# Patient Record
Sex: FEMALE | Race: BLACK OR AFRICAN AMERICAN | NOT HISPANIC OR LATINO | Employment: UNEMPLOYED | URBAN - METROPOLITAN AREA
[De-identification: names, ages, dates, MRNs, and addresses within clinical notes are randomized per-mention and may not be internally consistent; named-entity substitution may affect disease eponyms.]

---

## 2021-05-31 ENCOUNTER — RECORDS - HEALTHEAST (OUTPATIENT)
Dept: ADMINISTRATIVE | Facility: CLINIC | Age: 56
End: 2021-05-31

## 2021-06-16 PROBLEM — N89.8 VAGINAL DISCHARGE: Status: ACTIVE | Noted: 2019-05-29

## 2021-06-16 PROBLEM — M79.671 RIGHT FOOT PAIN: Status: ACTIVE | Noted: 2019-05-02

## 2021-06-16 PROBLEM — M54.42 CHRONIC BILATERAL LOW BACK PAIN WITH BILATERAL SCIATICA: Status: ACTIVE | Noted: 2018-04-26

## 2021-06-16 PROBLEM — G89.29 CHRONIC BILATERAL LOW BACK PAIN WITH BILATERAL SCIATICA: Status: ACTIVE | Noted: 2018-04-26

## 2021-06-16 PROBLEM — H52.13 MYOPIA OF BOTH EYES WITH ASTIGMATISM AND PRESBYOPIA: Status: ACTIVE | Noted: 2017-07-06

## 2021-06-16 PROBLEM — D50.0 IRON DEFICIENCY ANEMIA SECONDARY TO BLOOD LOSS (CHRONIC): Status: ACTIVE | Noted: 2020-01-24

## 2021-06-16 PROBLEM — R63.0 LOSS OF APPETITE: Status: ACTIVE | Noted: 2017-06-23

## 2021-06-16 PROBLEM — N20.0 RIGHT NEPHROLITHIASIS: Status: ACTIVE | Noted: 2019-05-02

## 2021-06-16 PROBLEM — R52 BODY ACHES: Status: ACTIVE | Noted: 2018-03-21

## 2021-06-16 PROBLEM — K42.9 UMBILICAL HERNIA WITHOUT OBSTRUCTION AND WITHOUT GANGRENE: Status: ACTIVE | Noted: 2019-05-02

## 2021-06-16 PROBLEM — R10.84 GENERALIZED ABDOMINAL PAIN: Status: ACTIVE | Noted: 2020-01-24

## 2021-06-16 PROBLEM — M54.41 CHRONIC BILATERAL LOW BACK PAIN WITH BILATERAL SCIATICA: Status: ACTIVE | Noted: 2018-04-26

## 2021-06-16 PROBLEM — H04.123 DRY EYES, BILATERAL: Status: ACTIVE | Noted: 2019-02-28

## 2021-06-16 PROBLEM — Z12.11 ENCOUNTER FOR SCREENING COLONOSCOPY: Status: ACTIVE | Noted: 2018-03-21

## 2021-06-16 PROBLEM — R63.4 UNINTENTIONAL WEIGHT LOSS: Status: ACTIVE | Noted: 2017-06-14

## 2021-06-16 PROBLEM — R07.9 CHEST PAIN: Status: ACTIVE | Noted: 2020-01-24

## 2021-06-16 PROBLEM — N89.8 VAGINAL DRYNESS: Status: ACTIVE | Noted: 2018-03-21

## 2021-06-16 PROBLEM — H40.039 ANATOMICAL NARROW ANGLE: Status: ACTIVE | Noted: 2017-07-06

## 2021-06-16 PROBLEM — H52.4 MYOPIA OF BOTH EYES WITH ASTIGMATISM AND PRESBYOPIA: Status: ACTIVE | Noted: 2017-07-06

## 2021-06-16 PROBLEM — H52.203 MYOPIA OF BOTH EYES WITH ASTIGMATISM AND PRESBYOPIA: Status: ACTIVE | Noted: 2017-07-06

## 2021-06-16 PROBLEM — E55.9 VITAMIN D DEFICIENCY: Status: ACTIVE | Noted: 2018-03-21

## 2021-06-16 PROBLEM — J01.20 SUBACUTE ETHMOIDAL SINUSITIS: Status: ACTIVE | Noted: 2020-02-26

## 2022-02-08 ENCOUNTER — OFFICE VISIT (OUTPATIENT)
Dept: FAMILY MEDICINE | Facility: CLINIC | Age: 57
End: 2022-02-08
Payer: COMMERCIAL

## 2022-02-08 ENCOUNTER — HOSPITAL ENCOUNTER (EMERGENCY)
Facility: HOSPITAL | Age: 57
Discharge: HOME | End: 2022-02-08
Attending: EMERGENCY MEDICINE | Admitting: EMERGENCY MEDICINE
Payer: COMMERCIAL

## 2022-02-08 ENCOUNTER — APPOINTMENT (OUTPATIENT)
Dept: RADIOLOGY | Facility: HOSPITAL | Age: 57
End: 2022-02-08
Payer: COMMERCIAL

## 2022-02-08 VITALS
SYSTOLIC BLOOD PRESSURE: 125 MMHG | DIASTOLIC BLOOD PRESSURE: 83 MMHG | TEMPERATURE: 99 F | OXYGEN SATURATION: 97 % | HEART RATE: 82 BPM | RESPIRATION RATE: 16 BRPM

## 2022-02-08 VITALS
DIASTOLIC BLOOD PRESSURE: 78 MMHG | BODY MASS INDEX: 27.88 KG/M2 | TEMPERATURE: 98.3 F | HEART RATE: 84 BPM | SYSTOLIC BLOOD PRESSURE: 152 MMHG | WEIGHT: 142 LBS | OXYGEN SATURATION: 100 % | RESPIRATION RATE: 18 BRPM | HEIGHT: 60 IN

## 2022-02-08 DIAGNOSIS — R07.89 CHEST DISCOMFORT: ICD-10-CM

## 2022-02-08 DIAGNOSIS — R07.9 CHEST PAIN, UNSPECIFIED TYPE: ICD-10-CM

## 2022-02-08 DIAGNOSIS — R68.83 CHILLS: ICD-10-CM

## 2022-02-08 DIAGNOSIS — N89.8 VAGINAL DISCHARGE: ICD-10-CM

## 2022-02-08 DIAGNOSIS — R05.9 COUGH: Primary | ICD-10-CM

## 2022-02-08 DIAGNOSIS — J06.9 VIRAL UPPER RESPIRATORY TRACT INFECTION: ICD-10-CM

## 2022-02-08 LAB
ALBUMIN SERPL-MCNC: 4.1 G/DL (ref 3.5–5)
ALBUMIN UR-MCNC: NEGATIVE MG/DL
ALP SERPL-CCNC: 86 U/L (ref 45–120)
ALT SERPL W P-5'-P-CCNC: 20 U/L (ref 0–45)
ANION GAP SERPL CALCULATED.3IONS-SCNC: 11 MMOL/L (ref 5–18)
APPEARANCE UR: CLEAR
AST SERPL W P-5'-P-CCNC: 16 U/L (ref 0–40)
BASOPHILS # BLD AUTO: 0 10E3/UL (ref 0–0.2)
BASOPHILS NFR BLD AUTO: 1 %
BILIRUB DIRECT SERPL-MCNC: <0.1 MG/DL
BILIRUB SERPL-MCNC: 0.3 MG/DL (ref 0–1)
BILIRUB UR QL STRIP: NEGATIVE
BUN SERPL-MCNC: 12 MG/DL (ref 8–22)
CALCIUM SERPL-MCNC: 8.8 MG/DL (ref 8.5–10.5)
CHLORIDE BLD-SCNC: 107 MMOL/L (ref 98–107)
CLUE CELLS: ABNORMAL
CO2 SERPL-SCNC: 23 MMOL/L (ref 22–31)
COLOR UR AUTO: YELLOW
CREAT SERPL-MCNC: 0.8 MG/DL (ref 0.6–1.1)
EOSINOPHIL # BLD AUTO: 0.1 10E3/UL (ref 0–0.7)
EOSINOPHIL NFR BLD AUTO: 2 %
ERYTHROCYTE [DISTWIDTH] IN BLOOD BY AUTOMATED COUNT: 15.1 % (ref 10–15)
FLUAV RNA SPEC QL NAA+PROBE: NEGATIVE
FLUBV RNA RESP QL NAA+PROBE: NEGATIVE
GFR SERPL CREATININE-BSD FRML MDRD: 87 ML/MIN/1.73M2
GLUCOSE BLD-MCNC: 133 MG/DL (ref 70–125)
GLUCOSE UR STRIP-MCNC: NEGATIVE MG/DL
HCT VFR BLD AUTO: 38.7 % (ref 35–47)
HGB BLD-MCNC: 12.2 G/DL (ref 11.7–15.7)
HGB UR QL STRIP: NEGATIVE
IMM GRANULOCYTES # BLD: 0 10E3/UL
IMM GRANULOCYTES NFR BLD: 0 %
KETONES UR STRIP-MCNC: ABNORMAL MG/DL
LEUKOCYTE ESTERASE UR QL STRIP: NEGATIVE
LIPASE SERPL-CCNC: 25 U/L (ref 0–52)
LYMPHOCYTES # BLD AUTO: 1.8 10E3/UL (ref 0.8–5.3)
LYMPHOCYTES NFR BLD AUTO: 26 %
MCH RBC QN AUTO: 27.7 PG (ref 26.5–33)
MCHC RBC AUTO-ENTMCNC: 31.5 G/DL (ref 31.5–36.5)
MCV RBC AUTO: 88 FL (ref 78–100)
MONOCYTES # BLD AUTO: 0.4 10E3/UL (ref 0–1.3)
MONOCYTES NFR BLD AUTO: 6 %
NEUTROPHILS # BLD AUTO: 4.5 10E3/UL (ref 1.6–8.3)
NEUTROPHILS NFR BLD AUTO: 65 %
NITRATE UR QL: NEGATIVE
NRBC # BLD AUTO: 0 10E3/UL
NRBC BLD AUTO-RTO: 0 /100
PH UR STRIP: 6 [PH] (ref 5–8)
PLATELET # BLD AUTO: 391 10E3/UL (ref 150–450)
POTASSIUM BLD-SCNC: 3.8 MMOL/L (ref 3.5–5)
PROT SERPL-MCNC: 7.5 G/DL (ref 6–8)
RBC # BLD AUTO: 4.4 10E6/UL (ref 3.8–5.2)
SARS-COV-2 RNA RESP QL NAA+PROBE: NEGATIVE
SODIUM SERPL-SCNC: 141 MMOL/L (ref 136–145)
SP GR UR STRIP: >=1.03 (ref 1–1.03)
TRICHOMONAS, WET PREP: ABNORMAL
TROPONIN I SERPL-MCNC: <0.01 NG/ML (ref 0–0.29)
UROBILINOGEN UR STRIP-ACNC: 0.2 E.U./DL
WBC # BLD AUTO: 6.8 10E3/UL (ref 4–11)
WBC'S/HIGH POWER FIELD, WET PREP: ABNORMAL
YEAST, WET PREP: ABNORMAL

## 2022-02-08 PROCEDURE — 87491 CHLMYD TRACH DNA AMP PROBE: CPT | Performed by: PHYSICIAN ASSISTANT

## 2022-02-08 PROCEDURE — 99204 OFFICE O/P NEW MOD 45 MIN: CPT | Performed by: PHYSICIAN ASSISTANT

## 2022-02-08 PROCEDURE — 36415 COLL VENOUS BLD VENIPUNCTURE: CPT | Performed by: PHYSICIAN ASSISTANT

## 2022-02-08 PROCEDURE — 87636 SARSCOV2 & INF A&B AMP PRB: CPT | Performed by: EMERGENCY MEDICINE

## 2022-02-08 PROCEDURE — 93005 ELECTROCARDIOGRAM TRACING: CPT | Performed by: PHYSICIAN ASSISTANT

## 2022-02-08 PROCEDURE — 85004 AUTOMATED DIFF WBC COUNT: CPT | Performed by: PHYSICIAN ASSISTANT

## 2022-02-08 PROCEDURE — 93010 ELECTROCARDIOGRAM REPORT: CPT | Performed by: INTERNAL MEDICINE

## 2022-02-08 PROCEDURE — 71046 X-RAY EXAM CHEST 2 VIEWS: CPT

## 2022-02-08 PROCEDURE — 84484 ASSAY OF TROPONIN QUANT: CPT | Performed by: PHYSICIAN ASSISTANT

## 2022-02-08 PROCEDURE — 87591 N.GONORRHOEAE DNA AMP PROB: CPT | Performed by: PHYSICIAN ASSISTANT

## 2022-02-08 PROCEDURE — 99284 EMERGENCY DEPT VISIT MOD MDM: CPT | Mod: 25

## 2022-02-08 PROCEDURE — 83690 ASSAY OF LIPASE: CPT | Performed by: PHYSICIAN ASSISTANT

## 2022-02-08 PROCEDURE — 81003 URINALYSIS AUTO W/O SCOPE: CPT | Performed by: PHYSICIAN ASSISTANT

## 2022-02-08 PROCEDURE — C9803 HOPD COVID-19 SPEC COLLECT: HCPCS

## 2022-02-08 PROCEDURE — 87210 SMEAR WET MOUNT SALINE/INK: CPT | Performed by: PHYSICIAN ASSISTANT

## 2022-02-08 PROCEDURE — 82248 BILIRUBIN DIRECT: CPT | Performed by: PHYSICIAN ASSISTANT

## 2022-02-08 RX ORDER — ALBUTEROL SULFATE 90 UG/1
2 AEROSOL, METERED RESPIRATORY (INHALATION) EVERY 6 HOURS
COMMUNITY
End: 2022-07-14

## 2022-02-08 ASSESSMENT — ENCOUNTER SYMPTOMS
DIFFICULTY URINATING: 0
FEVER: 0
HEADACHES: 0
LIGHT-HEADEDNESS: 1
EYE REDNESS: 0
SHORTNESS OF BREATH: 1
CONFUSION: 0
WHEEZING: 0
COUGH: 1
COUGH: 1
SHORTNESS OF BREATH: 0
MYALGIAS: 1
ABDOMINAL PAIN: 0
FATIGUE: 1
ARTHRALGIAS: 0
FEVER: 1
DIARRHEA: 1
NAUSEA: 1
COLOR CHANGE: 0
VOMITING: 1
NECK STIFFNESS: 0

## 2022-02-08 ASSESSMENT — MIFFLIN-ST. JEOR: SCORE: 1165.61

## 2022-02-09 ENCOUNTER — PATIENT OUTREACH (OUTPATIENT)
Dept: CARE COORDINATION | Facility: CLINIC | Age: 57
End: 2022-02-09
Payer: COMMERCIAL

## 2022-02-09 DIAGNOSIS — Z71.89 OTHER SPECIFIED COUNSELING: ICD-10-CM

## 2022-02-09 NOTE — PROGRESS NOTES
"Clinic Care Coordination Contact  [facility]: Post-Discharge Note  SITUATION                                                      Admission:    Admission Date: 02/08/22   Reason for Admission: Chest Wall Pain, Cough  Discharge:   Discharge Date: 02/08/22  Discharge Diagnosis: Viral upper respiratory tract infection    BACKGROUND                                                      Per hospital discharge summary and inpatient provider notes:    Marga Benavides is a 56 year old female with history of smoking, EtOH  who presents from walk-in care across the street for evaluation of chest discomfort.  When I discussed this with the patient and read the previous notes, it sounds like she is most concerned that she has not had heat in her apartment for a couple of months.  She states that because of this she developed a productive cough a couple of weeks ago which has persisted.  She was worried about pneumonia, and this is why she presented to the walk-in care.  She did note that with coughing she has some left-sided chest discomfort and it was described at urgent care as being somewhat pleuritic in nature so she was transferred to the ED.     ASSESSMENT      Enrollment  Primary Care Care Coordination Status: Not a Candidate    Discharge Assessment  How are you doing now that you are home?: \" I'm fine just tired \"  How are your symptoms? (Red Flag symptoms escalate to triage hotline per guidelines): Improved  Do you feel your condition is stable enough to be safe at home until your provider visit?: Yes  Does the patient have their discharge instructions? : Yes  Does the patient have questions regarding their discharge instructions? : No  Were you started on any new medications or were there changes to any of your previous medications? : Yes  Does the patient have all of their medications?: Yes  Do you have questions regarding any of your medications? : No  Do you have all of your needed medical supplies or " equipment (DME)?  (i.e. oxygen tank, CPAP, cane, etc.): Yes  Discharge follow-up appointment scheduled within 14 calendar days? : No  Is patient agreeable to assistance with scheduling? : No    Post-op ([facility] CTA Only)  If the patient had a surgery or procedure, do they have any questions for a nurse?: No             PLAN                                                      Outpatient Plan: Rest today and avoid strenuous activity.  Take any prescribed medicine as directed.  Be aware of any recurrent chest pain and notice any changes  Attached Information Chest Pain, Uncertain Cause (English)  Marga Benavides (MRN: [id number])   Printed at 2/8/22 10:24 PM Page 6 of 8                Follow-up care  Follow up with your healthcare provider if you do not start to feel better within 24 hours, or as advised.  No future appointments.      For any urgent concerns, please contact our 24 hour nurse triage line: [phone] ([phone])         Candelaria Munson MA

## 2022-02-09 NOTE — DISCHARGE INSTRUCTIONS
As we discussed, the x-ray does not show pneumonia, the test looking for heart attack is negative, and you do not meet any criteria that would be worrisome for a blood clot in your lungs.  Your heart tracing is slightly abnormal, so we are putting a referral in for you to see a cardiologist which is a heart specialist.  They should be calling you to schedule this appointment in the morning.  If you do not hear from them by Thursday morning, call the number above to schedule this appointment.

## 2022-02-09 NOTE — ED TRIAGE NOTES
In by self. Has been living in a building for two months with no heat. About a month ago Pt developed a cough that has gotten worse. States that she is coughing up green mucus. Pt also endorses body aces. Not sure if she has been having fevers. She has also been throwing up and diarrhea. States that she has left chest wall pain. States that pain is somewhat relieved when she pushes on it.

## 2022-02-09 NOTE — ED PROVIDER NOTES
Expected Patient Referral to ED  7:50 PM    Referring Clinic/Provider:  Thalia Castaneda [facility]    Reason for referral/Clinical facts:  Tobacco and alcohol abuse  Asthma  Productive cough x 2 weeks  Left sided chest pain, slightly pleuritic, not reproducible  Shortness of breath--lungs CTA  Bodyaches, nausea, vomiting    Vaginal discharge/itching/irritation    Work-up done: CXR negative, EKG no significant changes, no labs done  Wet prep was negative, Gonorrhea/chlamydia testing pending--self swab done, no pelvic done  UA in process    No COVID test done    Recommendations provided:  Send to ED for further evaluation    Caller was informed that this institution does possess the capabilities and/or resources to provide for patient and should be transferred to our facility.    Discussed that if direct admit is sought and any hurdles are encountered, this ED would be happy to see the patient and evaluate.    Informed caller that recommendations provided are recommendations based only on the facts provided and that they responsible to accept or reject the advice, or to seek a formal in person consultation as needed and that this ED will see/treat patient should they arrive.      Tiffany Schulz MD  Emergency Medicine  [facility] EMERGENCY DEPARTMENT  [facility] [facility] [address]  [phone]       Tiffany Schulz MD  02/08/22 1953

## 2022-02-09 NOTE — PROGRESS NOTES
Patient presents with:  Cough: Pain on L breast/chest area.  Diarrhea, vomiting, vaginal discharge, vaginal odor, body pain (currently has no heat in apartment for a few months believes that caused chest pain) x2 weeks       Clinical Decision Making: Patient experiencing left-sided pleuritic chest pain.  Chest x-ray is negative for signs of pneumonia.  Concern for PE given patient's tobacco use and age.  Patient referred to [facility] emergency department for further evaluation.  Report given to ED provider prior to the patient's arrival.  Patient experiencing vaginal discharge and irritation.  Wet prep and UA are negative for signs of infection.  Gonorrhea chlamydia test are in process.          ICD-10-CM    1. Cough  R05.9 XR Chest 2 Views     XR Chest 2 Views   2. Chest pain, unspecified type  R07.9 EKG 12-lead, tracing only   3. Vaginal discharge  N89.8 Wet preparation     Chlamydia & Gonorrhea by PCR, [facility] - Clinic Collect   4. Chills  R68.83 UA macro with reflex to Microscopic and Culture - Clinc Collect       There are no Patient Instructions on file for this visit.    HPI:  Marga Benavides is a 56 year old female with past medical history of tobacco dependence, alcohol abuse, body aches, vaginal discharge, mood disorder, personality disorder, mild persistent asthma who presents today complaining of ongoing productive cough for the past 2 weeks.  Today she started experiencing pain in the chest near her left breast area.  She also started experiencing more body aches, nausea and vomiting.  She states that she is had a previous history of left lung pneumonia in years past.  Unrelated she has been experiencing vaginal odor, vaginal discharge.  She denies any known STD exposures, but is interested in STD testing.  She has been taking Bare ASA, Tylenol, and Robitussin. She takes a nasal spray to help with her nasal congestion, but she's not sure what it is.  Today she has been feeling little bit more  short of breath than normal.  She denies hearing any wheezing, but she has been using her albuterol inhaler.  She denies any bilateral leg swelling, but has always had right sided foot swelling since a foot surgery years ago.  She reports some lightheadedness when she moves from a seated position to a standing position.  Patient states that her living situation is not ideal.  The building that she lives in lost heat for the past few months, so she has been heating the apartment with space heaters.  Last year it also had mold.  Building owner states that the mold was removed, but patient is not so sure.    History obtained from the patient.    Problem List:  2020-02: Subacute ethmoidal sinusitis  2020-01: Chest pain  2020-01: Generalized abdominal pain  2020-01: Iron deficiency anemia secondary to blood loss (chronic)  2019-05: Vaginal discharge  2019-05: Right foot pain  2019-05: Right nephrolithiasis  2019-05: Umbilical hernia without obstruction and without gangrene  2019-02: Dry eyes, bilateral  2018-04: Chronic bilateral low back pain with bilateral sciatica  2018-03: Body aches  2018-03: Encounter for screening colonoscopy  2018-03: Vaginal dryness  2018-03: Vitamin D deficiency  2017-07: Anatomical narrow angle  2017-07: Myopia of both eyes with astigmatism and presbyopia  2017-06: Loss of appetite  2017-06: Unintentional weight loss  2016-11: Cough  2016-11: Insomnia  2014-09: S/P abdominal hysterectomy  2014-05: Uterine fibroid  2010-05: Alcohol abuse  2010-05: Mood disorder (H)  2010-05: Personality disorder (H)  2008-12: Mild persistent asthma  2008-12: Tobacco dependence      No past medical history on file.    Social History     Tobacco Use     Smoking status: Current Every Day Smoker     Packs/day: 1.00     Smokeless tobacco: Never Used   Substance Use Topics     Alcohol use: Yes       Review of Systems   Constitutional: Positive for fever.        (+) body aches   HENT: Positive for congestion.     Respiratory: Positive for cough (couple of weeks, productive) and shortness of breath. Negative for wheezing.    Cardiovascular: Positive for chest pain (left sided, pleuritic) and leg swelling (right side only since foot surgery).   Gastrointestinal: Positive for diarrhea, nausea and vomiting.   Genitourinary: Positive for enuresis (secondary to cough) and vaginal discharge.        Positive for vaginal odor and itching   Neurological: Positive for light-headedness (moving from sitting to standing).       Vitals:    02/08/22 1729 02/08/22 1929   BP: (!) 145/84 125/83   BP Location: Right arm    Patient Position: Sitting    Cuff Size: Adult Regular    Pulse: 82    Resp: 16    Temp: 99  F (37.2  C)    TempSrc: Oral    SpO2: 97%        Physical Exam  Vitals and nursing note reviewed.   Constitutional:       General: She is not in acute distress.     Appearance: She is not toxic-appearing or diaphoretic.   HENT:      Head: Normocephalic and atraumatic.      Right Ear: Tympanic membrane, ear canal and external ear normal.      Left Ear: Tympanic membrane, ear canal and external ear normal.   Eyes:      Conjunctiva/sclera: Conjunctivae normal.   Cardiovascular:      Rate and Rhythm: Normal rate and regular rhythm.      Heart sounds: No murmur heard.      Pulmonary:      Effort: Pulmonary effort is normal. No respiratory distress.      Breath sounds: No stridor. No wheezing, rhonchi or rales.   Neurological:      Mental Status: She is alert.   Psychiatric:         Mood and Affect: Mood normal.         Behavior: Behavior normal.         Thought Content: Thought content normal.         Judgment: Judgment normal.         Results:  Results for orders placed or performed in visit on 02/08/22   UA macro with reflex to Microscopic and Culture - Clinc Collect     Status: Abnormal    Specimen: Urine, Midstream   Result Value Ref Range    Color Urine Yellow Colorless, Straw, Light Yellow, Yellow    Appearance Urine Clear Clear     Glucose Urine Negative Negative mg/dL    Bilirubin Urine Negative Negative    Ketones Urine Trace (A) Negative mg/dL    Specific Gravity Urine >=1.030 1.005 - 1.030    Blood Urine Negative Negative    pH Urine 6.0 5.0 - 8.0    Protein Albumin Urine Negative Negative mg/dL    Urobilinogen Urine 0.2 0.2, 1.0 E.U./dL    Nitrite Urine Negative Negative    Leukocyte Esterase Urine Negative Negative    Narrative    Microscopic not indicated   EKG 12-lead, tracing only     Status: None (Preliminary result)   Result Value Ref Range    Systolic Blood Pressure  mmHg    Diastolic Blood Pressure  mmHg    Ventricular Rate 65 BPM    Atrial Rate 65 BPM    CO Interval  ms    QRS Duration 76 ms     ms    QTc 405 ms    P Axis  degrees    R AXIS 179 degrees    T Axis 149 degrees    Interpretation ECG       Sinus rhythm  Right axis deviation  Nonspecific ST and T wave abnormality  Abnormal ECG  When compared with ECG of 19-MAY-2020 16:32,  QRS axis Shifted right  Non-specific change in ST segment in Lateral leads  T wave amplitude has increased in Anterior leads  T wave inversion now evident in Lateral leads     Wet preparation     Status: Abnormal    Specimen: Vagina; Swab   Result Value Ref Range    Trichomonas Absent Absent    Yeast Absent Absent    Clue Cells Absent Absent    WBCs/high power field 1+ (A) None         At the end of the encounter, I discussed results, diagnosis, medications. Discussed red flags for immediate return to clinic/ER, as well as indications for follow up if no improvement. Patient understood and agreed to plan. Patient was stable for discharge.

## 2022-02-09 NOTE — ED PROVIDER NOTES
EMERGENCY DEPARTMENT ENCOUNTER     NAME: Marga Benavides   AGE: 56 year old female   YOB: 1965   MRN: [id number]   EVALUATION DATE & TIME: No admission date for patient encounter.   PCP: Valentino Blandon     Chief Complaint   Patient presents with     Chest Wall Pain     Cough   :    FINAL IMPRESSION       1. Viral upper respiratory tract infection    2. Chest discomfort           ED COURSE & MEDICAL DECISION MAKING      Pertinent Labs & Imaging studies reviewed. (See chart for details)   56 year old female  presents to the Emergency Department for evaluation of left-sided chest discomfort in the setting of a couple of weeks of productive cough and patient with concern for pneumonia. Initial Vitals Reviewed. Initial exam notable for well-appearing patient with normal vitals, clear lungs.  I can see in the note from the walk-in care provider that she expressed wanting to rule out pulmonary embolus, and this patient has a wells score of 0 , has URI symptoms that would not otherwise be suggestive, is not hypoxic or tachycardic, is not short of breath, and the time course would not fit with atypical pulmonary embolus.  I think at this time she can be clinically ruled out.  I did consider chest wall discomfort, pneumothorax, pneumonia, less likely ACS as it is very atypical in nature, time course is not suspicious.  Her EKG has some T wave inversions in lateral leads but troponin is negative and I feel this adequately rules out ACS.  She does have some cardiac risk factors including smoking and has not previously had any cardiac work-up, so I think it is reasonable to refer her to rapid access cardiology to see whether she needs additional cardiac testing.  However, her heart score is low enough and her symptoms are likely more respiratory in nature with URI symptoms that this may be unrelated to tonight's acute presentation.  She did have several other issues including vaginal discharge, etc. and  these were addressed at walk-in care prior to arrival.  Chest x-ray was done here and is completely unremarkable with no signs of acute pathology.  Covid and influenza testing were done and are negative.  I did provide reassurance and discussed the plan for follow-up with cardiology outpatient.  She is quite comfortable with discharge at this time.           At the conclusion of the encounter I discussed the results of all of the tests and the disposition. The questions were answered. The patient or family acknowledged understanding and was agreeable with the care plan.         MEDICATIONS GIVEN IN THE EMERGENCY:   Medications - No data to display   NEW PRESCRIPTIONS STARTED AT TODAY'S ER VISIT   New Prescriptions    No medications on file     ================================================================   HISTORY OF PRESENT ILLNESS       Patient information was obtained from: patient  Use of Intrepreter: N/A   Marga Benavides is a 56 year old female with history of smoking, EtOH  who presents from walk-in care across the street for evaluation of chest discomfort.  When I discussed this with the patient and read the previous notes, it sounds like she is most concerned that she has not had heat in her apartment for a couple of months.  She states that because of this she developed a productive cough a couple of weeks ago which has persisted.  She was worried about pneumonia, and this is why she presented to the walk-in care.  She did note that with coughing she has some left-sided chest discomfort and it was described at urgent care as being somewhat pleuritic in nature so she was transferred to the ED.    ================================================================    REVIEW OF SYSTEMS       Review of Systems   Constitutional: Positive for fatigue. Negative for fever.   HENT: Negative for congestion.    Eyes: Negative for redness.   Respiratory: Positive for cough. Negative for shortness of breath.     Cardiovascular: Positive for chest pain.   Gastrointestinal: Negative for abdominal pain.   Genitourinary: Negative for difficulty urinating.   Musculoskeletal: Positive for myalgias. Negative for arthralgias and neck stiffness.   Skin: Negative for color change.   Neurological: Negative for headaches.   Psychiatric/Behavioral: Negative for confusion.   All other systems reviewed and are negative.        PAST HISTORY     PAST MEDICAL HISTORY:   No past medical history on file.   PAST SURGICAL HISTORY:   Past Surgical History:   Procedure Laterality Date     PARTIAL HYSTERECTOMY       TUBAL LIGATION        CURRENT MEDICATIONS:   acetaminophen (TYLENOL) 500 MG tablet  albuterol (PROAIR HFA/PROVENTIL HFA/VENTOLIN HFA) 108 (90 Base) MCG/ACT inhaler  famotidine (PEPCID) 20 MG tablet  guaiFENesin (ROBITUSSIN) 20 mg/mL SOLN solution  ondansetron (ZOFRAN) 4 MG tablet  oxyCODONE-acetaminophen (PERCOCET/ENDOCET) 5-325 mg per tablet  sucralfate (CARAFATE) 1 gram tablet      ALLERGIES:   No Known Allergies   FAMILY HISTORY:   No family history on file.   SOCIAL HISTORY:   Social History     Socioeconomic History     Marital status: Single     Spouse name: Not on file     Number of children: Not on file     Years of education: Not on file     Highest education level: Not on file   Occupational History     Not on file   Tobacco Use     Smoking status: Current Every Day Smoker     Packs/day: 1.00     Smokeless tobacco: Never Used   Substance and Sexual Activity     Alcohol use: Yes     Drug use: Not on file     Sexual activity: Not on file   Other Topics Concern     Not on file   Social History Narrative     Not on file     Social Determinants of Health     Financial Resource Strain: Not on file   Food Insecurity: Not on file   Transportation Needs: Not on file   Physical Activity: Not on file   Stress: Not on file   Social Connections: Not on file   Intimate Partner Violence: Not on file   Housing Stability: Not on file         VITALS  Patient Vitals for the past 24 hrs:   BP Temp Pulse Resp SpO2 Height Weight   02/08/22 2015 -- -- -- -- -- 1.524 m (5') 64.4 kg (142 lb)   02/08/22 2014 (!) 168/96 98.3  F (36.8  C) 75 16 98 % -- --        ================================================================    PHYSICAL EXAM     VITAL SIGNS: BP (!) 168/96   Pulse 75   Temp 98.3  F (36.8  C)   Resp 16   Ht 1.524 m (5')   Wt 64.4 kg (142 lb)   SpO2 98%   BMI 27.73 kg/m     Constitutional:  Awake, no acute distress   HENT:  Atraumatic, oropharynx without exudate or erythema, membranes moist  Lymph:  No adenopathy  Eyes: EOM intact, PERRL, no injection  Neck: Supple  Respiratory:  Clear to auscultation bilaterally, no wheezes or crackles   Cardiovascular:  Regular rate and rhythm, single S1 and S2   GI:  Soft, nontender, nondistended, no rebound or guarding   Musculoskeletal:  Moves all extremities, no lower extremity edema, no deformities    Skin:  Warm, dry  Neurologic:  Alert and oriented x3, no focal deficits noted       ================================================================  LAB       All pertinent labs reviewed and interpreted.   Labs Ordered and Resulted from Time of ED Arrival to Time of ED Departure   BASIC METABOLIC PANEL - Abnormal       Result Value    Sodium 141      Potassium 3.8      Chloride 107      Carbon Dioxide (CO2) 23      Anion Gap 11      Urea Nitrogen 12      Creatinine 0.80      Calcium 8.8      Glucose 133 (*)     GFR Estimate 87     CBC WITH PLATELETS AND DIFFERENTIAL - Abnormal    WBC Count 6.8      RBC Count 4.40      Hemoglobin 12.2      Hematocrit 38.7      MCV 88      MCH 27.7      MCHC 31.5      RDW 15.1 (*)     Platelet Count 391      % Neutrophils 65      % Lymphocytes 26      % Monocytes 6      % Eosinophils 2      % Basophils 1      % Immature Granulocytes 0      NRBCs per 100 WBC 0      Absolute Neutrophils 4.5      Absolute Lymphocytes 1.8      Absolute Monocytes 0.4      Absolute  Eosinophils 0.1      Absolute Basophils 0.0      Absolute Immature Granulocytes 0.0      Absolute NRBCs 0.0     INFLUENZA A/B & SARS-COV2 PCR MULTIPLEX - Normal    Influenza A PCR Negative      Influenza B PCR Negative      SARS CoV2 PCR Negative     TROPONIN I - Normal    Troponin I <0.01     HEPATIC FUNCTION PANEL - Normal    Bilirubin Total 0.3      Bilirubin Direct <0.1      Protein Total 7.5      Albumin 4.1      Alkaline Phosphatase 86      AST 16      ALT 20     LIPASE - Normal    Lipase 25          ===============================================================  RADIOLOGY       Reviewed all pertinent imaging. Please see official radiology report.   Chest XR,  PA & LAT   Final Result   IMPRESSION: Several old mid right posterior rib fractures. Chest otherwise negative.            ================================================================  EKG     EKG reviewed interpreted by me shows sinus rhythm with rate of 65, normal axis,  with lateral T wave inversions in 1 and aVL that do appear new since 2020    I have independently reviewed and interpreted the EKG(s) documented above.     ================================================================  PROCEDURES            Munira Jackman M.D.   Emergency Medicine   [facility] EMERGENCY DEPARTMENT  [facility] [facility] [id number] [phone]  Dept: [phone]        Munira Jackman MD  02/08/22 8491       Munira Jackman MD  02/09/22 [id number]

## 2022-02-10 LAB
C TRACH DNA SPEC QL PROBE+SIG AMP: NEGATIVE
N GONORRHOEA DNA SPEC QL NAA+PROBE: NEGATIVE

## 2022-02-11 LAB
ATRIAL RATE - MUSE: 65 BPM
DIASTOLIC BLOOD PRESSURE - MUSE: NORMAL MMHG
INTERPRETATION ECG - MUSE: NORMAL
P AXIS - MUSE: NORMAL DEGREES
PR INTERVAL - MUSE: NORMAL MS
QRS DURATION - MUSE: 76 MS
QT - MUSE: 390 MS
QTC - MUSE: 405 MS
R AXIS - MUSE: 179 DEGREES
SYSTOLIC BLOOD PRESSURE - MUSE: NORMAL MMHG
T AXIS - MUSE: 149 DEGREES
VENTRICULAR RATE- MUSE: 65 BPM

## 2022-02-16 ENCOUNTER — OFFICE VISIT (OUTPATIENT)
Dept: CARDIOLOGY | Facility: CLINIC | Age: 57
End: 2022-02-16
Payer: COMMERCIAL

## 2022-02-16 VITALS
SYSTOLIC BLOOD PRESSURE: 134 MMHG | HEART RATE: 70 BPM | OXYGEN SATURATION: 99 % | RESPIRATION RATE: 16 BRPM | DIASTOLIC BLOOD PRESSURE: 68 MMHG | BODY MASS INDEX: 26.56 KG/M2 | WEIGHT: 136 LBS

## 2022-02-16 DIAGNOSIS — F10.10 ALCOHOL ABUSE: Primary | ICD-10-CM

## 2022-02-16 DIAGNOSIS — R07.89 CHEST DISCOMFORT: ICD-10-CM

## 2022-02-16 DIAGNOSIS — R12 HEARTBURN: ICD-10-CM

## 2022-02-16 DIAGNOSIS — F17.200 TOBACCO DEPENDENCE: ICD-10-CM

## 2022-02-16 PROCEDURE — 99204 OFFICE O/P NEW MOD 45 MIN: CPT | Performed by: INTERNAL MEDICINE

## 2022-02-16 RX ORDER — CARBOXYMETHYLCELLULOSE SODIUM 0.5 G/100ML
SOLUTION/ DROPS OPHTHALMIC
COMMUNITY
Start: 2021-11-09

## 2022-02-16 RX ORDER — FLUOXETINE 10 MG/1
10 CAPSULE ORAL DAILY
COMMUNITY
Start: 2021-11-17 | End: 2022-07-14

## 2022-02-16 NOTE — LETTER
"2/16/2022    Valentino Giang, CNP  [address] [facility] [address]    RE: Marga Ramosdwell       Dear Colleague,     I had the pleasure of seeing Marga Benavides in the [facility].    CARDIOLOGY RAPID ACCESS CLINIC CONSULT NOTE     Assessment/Plan:   1.  Epigastric discomfort consistent with gastritis or gastroesophageal reflux.  We will give trial of treatment with omeprazole.  2.  Sedentary lifestyle.  Advocated moderate exercise regimen, targeting 150 minutes weekly  3.  Tobacco abuse, abstinent over the last week.  Congratulated for her \"cold turkey\" quitting of smoking.  4.  History of alcohol abuse, currently decreasing her usage.  Encouraged to continue her efforts in this regard as this could be contributing to her stomach discomfort     History of Present Illness:     It is my pleasure to see Marga Benavides at the [facility] RAPID ACCESS clinic for evaluation of chest discomfort, \" heartburn\".    Marga Benavides is a 56 year old female with a past medical history of tobacco abuse, alcohol abuse, who has been on acid lowering treatment in the past for her heartburn, has recently had an increase in her alcohol intake and was having increased abdominal discomfort similar to her previous heartburn symptoms.  She reports that she was out of her medications and presented to the emergency room for further evaluation.  ECG and troponins were within normal limits, she was discharged to home and returns here for routine follow-up.    She reports that for the last week she has been out of cigarettes and has not smoked.  She has cut her alcohol use down to less than a full glass of wine daily, but had increased intake on the day of the Super Bowl.  She did have some emesis for a couple of days afterwards.  Her stomach discomfort has been helped somewhat with decreasing alcohol intake.  She requests a refill on her heartburn medication.  Discomfort remains " epigastric in location and intermittent through the course of the day.  She feels that her abdomen has swollen and her weight is up over the last year.    She denies any chest pain or lightheadedness.  She has had no syncope or near syncopal spells.  There is no history of diabetes mellitus or hypertension or hyperlipidemia.  There is no family history of premature coronary atherosclerosis.    Past Medical History:     Patient Active Problem List   Diagnosis     Alcohol abuse     Anatomical narrow angle     Body aches     Chest pain     Chronic bilateral low back pain with bilateral sciatica     Cough     Dry eyes, bilateral     Encounter for screening colonoscopy     Generalized abdominal pain     Insomnia     Iron deficiency anemia secondary to blood loss (chronic)     Loss of appetite     Mild persistent asthma     Mood disorder (H)     Personality disorder (H)     Myopia of both eyes with astigmatism and presbyopia     Right foot pain     Right nephrolithiasis     S/P abdominal hysterectomy     Subacute ethmoidal sinusitis     Tobacco dependence     Umbilical hernia without obstruction and without gangrene     Unintentional weight loss     Uterine fibroid     Vaginal discharge     Vaginal dryness     Vitamin D deficiency       Past Surgical History:     Past Surgical History:   Procedure Laterality Date     PARTIAL HYSTERECTOMY       TUBAL LIGATION         Family History:   History reviewed. No pertinent family history.  Family history reviewed and is not pertinent to the chief complaint or presenting problem    Social History:    reports that she has been smoking. She has been smoking about 1.00 pack per day. She has never used smokeless tobacco. She reports current alcohol use.    Exercise: Minimal    Sleep: Restorative    Meds:     Current Outpatient Medications   Medication Sig Dispense Refill     acetaminophen (TYLENOL) 500 MG tablet [ACETAMINOPHEN (TYLENOL) 500 MG TABLET] Take 2 tablets (1,000 mg total) by  mouth every 8 (eight) hours as needed for pain. 60 tablet 0     albuterol (PROAIR HFA/PROVENTIL HFA/VENTOLIN HFA) 108 (90 Base) MCG/ACT inhaler Inhale 2 puffs into the lungs every 6 hours       FLUoxetine (PROZAC) 10 MG capsule Take 10 mg by mouth daily       guaiFENesin (ROBITUSSIN) 20 mg/mL SOLN solution Take 10 mLs by mouth every 4 hours as needed for cough       LUBRICATING PLUS EYE DROPS 0.5 % ophthalmic solution        famotidine (PEPCID) 20 MG tablet [FAMOTIDINE (PEPCID) 20 MG TABLET] Take 1 tablet (20 mg total) by mouth 2 (two) times a day. (Patient not taking: Reported on 2/16/2022) 30 tablet 0     ondansetron (ZOFRAN) 4 MG tablet [ONDANSETRON (ZOFRAN) 4 MG TABLET] Take 1 tablet (4 mg total) by mouth every 8 (eight) hours as needed. (Patient not taking: Reported on 2/16/2022) 12 tablet 0     oxyCODONE-acetaminophen (PERCOCET/ENDOCET) 5-325 mg per tablet [OXYCODONE-ACETAMINOPHEN (PERCOCET/ENDOCET) 5-325 MG PER TABLET] Take 1 tablet by mouth every 6 (six) hours as needed. (Patient not taking: Reported on 2/16/2022) 13 tablet 0     sucralfate (CARAFATE) 1 gram tablet [SUCRALFATE (CARAFATE) 1 GRAM TABLET] Take 1 tablet (1 g total) by mouth 4 (four) times a day. (Patient not taking: Reported on 2/16/2022) 60 tablet 0       Allergies:   Patient has no known allergies.    Review of Systems:        Positive for visual disturbances, cough, shortness of breath, wheezing, arm pain, diarrhea, muscle pain, depression, loss of balance.  12 point review of systems otherwise negative.      Objective:      Physical Exam  61.7 kg (136 lb)     Body mass index is 26.56 kg/m .  /68 (BP Location: Right arm, Patient Position: Sitting, Cuff Size: Adult Regular)   Pulse 70   Resp 16   Wt 61.7 kg (136 lb)   SpO2 99%   BMI 26.56 kg/m          General Appearance : Awake, Alert, No acute distress  HEENT: No Scleral icterus; the mucous membranes were pink and moist.  Conjunctivae not injected  Neck:  No cervical bruits,  jugular venous distention, or thyromegaly     Chest: The spine was straight. Chest wall symmetric  Lungs: Respirations unlabored; the lungs are clear to auscultation.  No wheezing     Cardiovascular:   Normal point of maximal impulse.  Auscultation reveals normal first and second heart sounds with no murmurs, rubs, or gallops.  Carotid, radial, and dorsalis pedal pulses are intact and symmetric.    Abdomen: Protuberant.  Soft, no organomegaly, masses, bruits, or tenderness. Bowels sounds are present  Extremities: No edema  Skin: No xanthelasma. Warm, Dry.  Musculoskeletal: No tenderness.  Neurologic: Alert and oriented ×3. Speech is fluent.      EKG (personally reviewed):  2/8/2022.  Sinus rhythm 65 bpm.  Switched arm leads.    Cardiac Imaging Studies:  EXAM: XR CHEST 2 VW  LOCATION: [facility]  DATE/TIME: 2/8/2022 8:45 PM  INDICATION: chest pain; cough  COMPARISON: 02/08/2022                                                             IMPRESSION: Several old mid right posterior rib fractures. Chest otherwise negative.    CTA chest PE run 1/2020:  1.  No pulmonary artery embolism.  2.  Nonaneurysmal aorta.  3.  Bilateral nondisplaced rib fractures, some are likely old, though others age-indeterminate. Correlate for pain and trauma history.   4.  No pleural effusion or pneumothorax.   5.  No consolidation. Mild emphysema. A few regions of mild bronchiectasis. No significant airway thickening.   6.  Minimal pulmonary groundglass foci (under 5 mm). These are likely infectious or inflammatory. No routine follow-up per guidelines below.   7.  Right lower lobe 2 mm solid nodule. Optional 12 month follow-up could be considered per guidelines below.         Lab Review   Lab Results   Component Value Date     02/08/2022    CO2 23 02/08/2022    BUN 12 02/08/2022     Lab Results   Component Value Date    WBC 6.8 02/08/2022    HGB 12.2 02/08/2022    HCT 38.7 02/08/2022    MCV 88 02/08/2022    PLT  391 02/08/2022     No results found for: CHOL, TRIG, HDL  Lab Results   Component Value Date    TROPONINI <0.01 02/08/2022     No results found for: BNP  Lab Results   Component Value Date    TSH 0.99 05/19/2020       Ed Lr MD, MD [facility]      This note created using Dragon voice recognition software. Sound alike errors may have escaped editing.       Thank you for allowing me to participate in the care of your patient.      Sincerely,     Ed Lr MD     [facility]  cc:   Munira Jackman MD  [address] [place] [address]

## 2022-02-16 NOTE — PATIENT INSTRUCTIONS
1. Congratulations on quitting smoking!  2. Continue your efforts at decreasing your alcohol intake at this may be contributing to your stomach upset.  3. Start omeprazole 20 mg daily to help relieve your stomach discomfort.  4. Work on getting back to a regular exercise regimen, targeting walking for at least 20 minutes on a daily basis or 150 minutes a week.

## 2022-02-16 NOTE — PROGRESS NOTES
"  CARDIOLOGY RAPID ACCESS CLINIC CONSULT NOTE     Assessment/Plan:   1.  Epigastric discomfort consistent with gastritis or gastroesophageal reflux.  We will give trial of treatment with omeprazole.  2.  Sedentary lifestyle.  Advocated moderate exercise regimen, targeting 150 minutes weekly  3.  Tobacco abuse, abstinent over the last week.  Congratulated for her \"cold turkey\" quitting of smoking.  4.  History of alcohol abuse, currently decreasing her usage.  Encouraged to continue her efforts in this regard as this could be contributing to her stomach discomfort     History of Present Illness:     It is my pleasure to see Marga Benavides at the [facility] RAPID ACCESS clinic for evaluation of chest discomfort, \" heartburn\".    Marga Benavides is a 56 year old female with a past medical history of tobacco abuse, alcohol abuse, who has been on acid lowering treatment in the past for her heartburn, has recently had an increase in her alcohol intake and was having increased abdominal discomfort similar to her previous heartburn symptoms.  She reports that she was out of her medications and presented to the emergency room for further evaluation.  ECG and troponins were within normal limits, she was discharged to home and returns here for routine follow-up.    She reports that for the last week she has been out of cigarettes and has not smoked.  She has cut her alcohol use down to less than a full glass of wine daily, but had increased intake on the day of the Super Bowl.  She did have some emesis for a couple of days afterwards.  Her stomach discomfort has been helped somewhat with decreasing alcohol intake.  She requests a refill on her heartburn medication.  Discomfort remains epigastric in location and intermittent through the course of the day.  She feels that her abdomen has swollen and her weight is up over the last year.    She denies any chest pain or lightheadedness.  She has had no " syncope or near syncopal spells.  There is no history of diabetes mellitus or hypertension or hyperlipidemia.  There is no family history of premature coronary atherosclerosis.    Past Medical History:     Patient Active Problem List   Diagnosis     Alcohol abuse     Anatomical narrow angle     Body aches     Chest pain     Chronic bilateral low back pain with bilateral sciatica     Cough     Dry eyes, bilateral     Encounter for screening colonoscopy     Generalized abdominal pain     Insomnia     Iron deficiency anemia secondary to blood loss (chronic)     Loss of appetite     Mild persistent asthma     Mood disorder (H)     Personality disorder (H)     Myopia of both eyes with astigmatism and presbyopia     Right foot pain     Right nephrolithiasis     S/P abdominal hysterectomy     Subacute ethmoidal sinusitis     Tobacco dependence     Umbilical hernia without obstruction and without gangrene     Unintentional weight loss     Uterine fibroid     Vaginal discharge     Vaginal dryness     Vitamin D deficiency       Past Surgical History:     Past Surgical History:   Procedure Laterality Date     PARTIAL HYSTERECTOMY       TUBAL LIGATION         Family History:   History reviewed. No pertinent family history.  Family history reviewed and is not pertinent to the chief complaint or presenting problem    Social History:    reports that she has been smoking. She has been smoking about 1.00 pack per day. She has never used smokeless tobacco. She reports current alcohol use.    Exercise: Minimal    Sleep: Restorative    Meds:     Current Outpatient Medications   Medication Sig Dispense Refill     acetaminophen (TYLENOL) 500 MG tablet [ACETAMINOPHEN (TYLENOL) 500 MG TABLET] Take 2 tablets (1,000 mg total) by mouth every 8 (eight) hours as needed for pain. 60 tablet 0     albuterol (PROAIR HFA/PROVENTIL HFA/VENTOLIN HFA) 108 (90 Base) MCG/ACT inhaler Inhale 2 puffs into the lungs every 6 hours       FLUoxetine (PROZAC) 10  MG capsule Take 10 mg by mouth daily       guaiFENesin (ROBITUSSIN) 20 mg/mL SOLN solution Take 10 mLs by mouth every 4 hours as needed for cough       LUBRICATING PLUS EYE DROPS 0.5 % ophthalmic solution        famotidine (PEPCID) 20 MG tablet [FAMOTIDINE (PEPCID) 20 MG TABLET] Take 1 tablet (20 mg total) by mouth 2 (two) times a day. (Patient not taking: Reported on 2/16/2022) 30 tablet 0     ondansetron (ZOFRAN) 4 MG tablet [ONDANSETRON (ZOFRAN) 4 MG TABLET] Take 1 tablet (4 mg total) by mouth every 8 (eight) hours as needed. (Patient not taking: Reported on 2/16/2022) 12 tablet 0     oxyCODONE-acetaminophen (PERCOCET/ENDOCET) 5-325 mg per tablet [OXYCODONE-ACETAMINOPHEN (PERCOCET/ENDOCET) 5-325 MG PER TABLET] Take 1 tablet by mouth every 6 (six) hours as needed. (Patient not taking: Reported on 2/16/2022) 13 tablet 0     sucralfate (CARAFATE) 1 gram tablet [SUCRALFATE (CARAFATE) 1 GRAM TABLET] Take 1 tablet (1 g total) by mouth 4 (four) times a day. (Patient not taking: Reported on 2/16/2022) 60 tablet 0       Allergies:   Patient has no known allergies.    Review of Systems:        Positive for visual disturbances, cough, shortness of breath, wheezing, arm pain, diarrhea, muscle pain, depression, loss of balance.  12 point review of systems otherwise negative.      Objective:      Physical Exam  61.7 kg (136 lb)     Body mass index is 26.56 kg/m .  /68 (BP Location: Right arm, Patient Position: Sitting, Cuff Size: Adult Regular)   Pulse 70   Resp 16   Wt 61.7 kg (136 lb)   SpO2 99%   BMI 26.56 kg/m          General Appearance : Awake, Alert, No acute distress  HEENT: No Scleral icterus; the mucous membranes were pink and moist.  Conjunctivae not injected  Neck:  No cervical bruits, jugular venous distention, or thyromegaly     Chest: The spine was straight. Chest wall symmetric  Lungs: Respirations unlabored; the lungs are clear to auscultation.  No wheezing     Cardiovascular:   Normal point of  maximal impulse.  Auscultation reveals normal first and second heart sounds with no murmurs, rubs, or gallops.  Carotid, radial, and dorsalis pedal pulses are intact and symmetric.    Abdomen: Protuberant.  Soft, no organomegaly, masses, bruits, or tenderness. Bowels sounds are present  Extremities: No edema  Skin: No xanthelasma. Warm, Dry.  Musculoskeletal: No tenderness.  Neurologic: Alert and oriented ×3. Speech is fluent.      EKG (personally reviewed):  2/8/2022.  Sinus rhythm 65 bpm.  Switched arm leads.    Cardiac Imaging Studies:  EXAM: XR CHEST 2 VW  LOCATION: [facility]  DATE/TIME: 2/8/2022 8:45 PM  INDICATION: chest pain; cough  COMPARISON: 02/08/2022                                                             IMPRESSION: Several old mid right posterior rib fractures. Chest otherwise negative.    CTA chest PE run 1/2020:  1.  No pulmonary artery embolism.  2.  Nonaneurysmal aorta.  3.  Bilateral nondisplaced rib fractures, some are likely old, though others age-indeterminate. Correlate for pain and trauma history.   4.  No pleural effusion or pneumothorax.   5.  No consolidation. Mild emphysema. A few regions of mild bronchiectasis. No significant airway thickening.   6.  Minimal pulmonary groundglass foci (under 5 mm). These are likely infectious or inflammatory. No routine follow-up per guidelines below.   7.  Right lower lobe 2 mm solid nodule. Optional 12 month follow-up could be considered per guidelines below.         Lab Review   Lab Results   Component Value Date     02/08/2022    CO2 23 02/08/2022    BUN 12 02/08/2022     Lab Results   Component Value Date    WBC 6.8 02/08/2022    HGB 12.2 02/08/2022    HCT 38.7 02/08/2022    MCV 88 02/08/2022     02/08/2022     No results found for: CHOL, TRIG, HDL  Lab Results   Component Value Date    TROPONINI <0.01 02/08/2022     No results found for: BNP  Lab Results   Component Value Date    TSH 0.99 05/19/2020        Ed Lr MD, MD [facility]ACC      This note created using Dragon voice recognition software. Sound alike errors may have escaped editing.

## 2022-02-18 ENCOUNTER — NURSE TRIAGE (OUTPATIENT)
Dept: NURSING | Facility: CLINIC | Age: 57
End: 2022-02-18
Payer: COMMERCIAL

## 2022-02-18 NOTE — TELEPHONE ENCOUNTER
RN triage   Call from pt   Pt states seen in ED last week   Pt states she got a call about her labs yesterday -- and calling back now   Most labs from 2/8-- not visible to pt  Note on STD labs -- to let pt know --- reviewed those labs w/ pt   Pt states she has diarrhea x4 since yesterday -- no blood   Pt states she vomited x 1  -- took tums   Drinking fluids well -- U.O. OK   No fever   Pt states maybe something she ate     Reviewed diarrhea home care advice     Transferred pt to Medical Records to get other lab results     Maritza Shah RN  [facility]  Triage Nurse Advisor    COVID 19 Nurse Triage Plan/Patient Instructions    Please be aware that novel coronavirus (COVID-19) may be circulating in the community. If you develop symptoms such as fever, cough, or SOB or if you have concerns about the presence of another infection including coronavirus (COVID-19), please contact your health care provider or visit [email or web address].     Disposition/Instructions    Home care recommended. Follow home care protocol based instructions.    Thank you for taking steps to prevent the spread of this virus.  o Limit your contact with others.  o Wear a simple mask to cover your cough.  o Wash your hands well and often.    Resources    [facility]: About COVID-19: [email or web address]    CDC: What to Do If You're Sick: www.cdc.gov/coronavirus/2019-ncov/about/steps-when-sick.html    CDC: Ending Home Isolation: www.cdc.gov/coronavirus/2019-ncov/hcp/disposition-in-home-patients.html     CDC: Caring for Someone: www.cdc.gov/coronavirus/2019-ncov/if-you-are-sick/care-for-someone.html     [facility]: Interim Guidance for Hospital Discharge to Home: [email or web address]/coronavirus/hcp/hospdischarge.pdf    [facility] clinical trials (COVID-19 research studies): [email or web address]     Below are the COVID-19 hotlines at the [place] Department of [facility] ([facility]). Interpreters are  available.   o For health questions: Call 997-997-2840 or 1-139.641.1828 (7 a.m. to 7 p.m.)  o For questions about schools and childcare: Call 416-871-2265 or 1-873.941.8080 (7 a.m. to 7 p.m.)      Reason for Disposition    MILD-MODERATE diarrhea (e.g., 1-6 times / day more than normal)    Additional Information    Negative: Shock suspected (e.g., cold/pale/clammy skin, too weak to stand, low BP, rapid pulse)    Negative: Difficult to awaken or acting confused (e.g., disoriented, slurred speech)    Negative: Sounds like a life-threatening emergency to the triager    Negative: Vomiting also present and worse than the diarrhea    Negative: Blood in stool and without diarrhea    Negative: SEVERE abdominal pain (e.g., excruciating) and present > 1 hour    Negative: SEVERE abdominal pain and age > 60    Negative: Bloody, black, or tarry bowel movements (Exception: chronic-unchanged black-grey bowel movements and is taking iron pills or Pepto-bismol)    Negative: SEVERE diarrhea (e.g., 7 or more times / day more than normal) and age > 60 years    Negative: Drinking very little and has signs of dehydration (e.g., no urine > 12 hours, very dry mouth, very lightheaded)    Negative: Constant abdominal pain lasting > 2 hours    Negative: SEVERE diarrhea (e.g., 7 or more times / day more than normal) and present > 24 hours (1 day)    Negative: MODERATE diarrhea (e.g., 4-6 times / day more than normal) and present > 48 hours (2 days)    Negative: MODERATE diarrhea (e.g., 4-6 times / day more than normal) and age > 70 years    Negative: Abdominal pain  (Exception: pain clears completely with each passage of diarrhea stool)    Negative: Blood in the stool    Negative: Fever > 101 F (38.3 C)    Negative: Weak immune system (e.g., HIV positive, cancer chemo, splenectomy, organ transplant, chronic steroids)    Negative: Travel to a foreign country in past month    Negative: Recent antibiotic therapy (i.e., within last 2 months) and  diarrhea present > 3 days since antibiotic was stopped    Negative: Recent hospitalization and diarrhea present > 3 days    Negative: MILD diarrhea (e.g., 1-3 or more stools than normal in past 24 hours) diarrhea without known cause and present > 7 days    Protocols used: DIARRHEA-A-OH

## 2022-02-26 ENCOUNTER — APPOINTMENT (OUTPATIENT)
Dept: CT IMAGING | Facility: HOSPITAL | Age: 57
End: 2022-02-26
Attending: EMERGENCY MEDICINE
Payer: COMMERCIAL

## 2022-02-26 ENCOUNTER — HOSPITAL ENCOUNTER (EMERGENCY)
Facility: HOSPITAL | Age: 57
Discharge: HOME | End: 2022-02-26
Attending: EMERGENCY MEDICINE | Admitting: EMERGENCY MEDICINE
Payer: COMMERCIAL

## 2022-02-26 VITALS
RESPIRATION RATE: 16 BRPM | OXYGEN SATURATION: 97 % | TEMPERATURE: 98.6 F | HEART RATE: 74 BPM | DIASTOLIC BLOOD PRESSURE: 69 MMHG | BODY MASS INDEX: 27.48 KG/M2 | WEIGHT: 140 LBS | SYSTOLIC BLOOD PRESSURE: 112 MMHG | HEIGHT: 60 IN

## 2022-02-26 DIAGNOSIS — R10.9 FLANK PAIN: ICD-10-CM

## 2022-02-26 LAB
ALBUMIN SERPL-MCNC: 4 G/DL (ref 3.5–5)
ALBUMIN UR-MCNC: 20 MG/DL
ALP SERPL-CCNC: 78 U/L (ref 45–120)
ALT SERPL W P-5'-P-CCNC: 11 U/L (ref 0–45)
ANION GAP SERPL CALCULATED.3IONS-SCNC: 12 MMOL/L (ref 5–18)
APAP SERPL-MCNC: <3 UG/ML (ref 10–20)
APPEARANCE UR: ABNORMAL
AST SERPL W P-5'-P-CCNC: 16 U/L (ref 0–40)
BACTERIA #/AREA URNS HPF: ABNORMAL /HPF
BASOPHILS # BLD AUTO: 0 10E3/UL (ref 0–0.2)
BASOPHILS NFR BLD AUTO: 0 %
BILIRUB SERPL-MCNC: 0.4 MG/DL (ref 0–1)
BILIRUB UR QL STRIP: NEGATIVE
BUN SERPL-MCNC: 12 MG/DL (ref 8–22)
CALCIUM SERPL-MCNC: 10 MG/DL (ref 8.5–10.5)
CHLORIDE BLD-SCNC: 104 MMOL/L (ref 98–107)
CO2 SERPL-SCNC: 22 MMOL/L (ref 22–31)
COLOR UR AUTO: YELLOW
CREAT SERPL-MCNC: 0.74 MG/DL (ref 0.6–1.1)
EOSINOPHIL # BLD AUTO: 0 10E3/UL (ref 0–0.7)
EOSINOPHIL NFR BLD AUTO: 1 %
ERYTHROCYTE [DISTWIDTH] IN BLOOD BY AUTOMATED COUNT: 14.6 % (ref 10–15)
GFR SERPL CREATININE-BSD FRML MDRD: >90 ML/MIN/1.73M2
GLUCOSE BLD-MCNC: 115 MG/DL (ref 70–125)
GLUCOSE UR STRIP-MCNC: NEGATIVE MG/DL
HCT VFR BLD AUTO: 38.5 % (ref 35–47)
HGB BLD-MCNC: 12.3 G/DL (ref 11.7–15.7)
HGB UR QL STRIP: NEGATIVE
HOLD SPECIMEN: NORMAL
IMM GRANULOCYTES # BLD: 0 10E3/UL
IMM GRANULOCYTES NFR BLD: 1 %
INR PPP: 1.05 (ref 0.85–1.15)
KETONES UR STRIP-MCNC: NEGATIVE MG/DL
LEUKOCYTE ESTERASE UR QL STRIP: NEGATIVE
LYMPHOCYTES # BLD AUTO: 1.6 10E3/UL (ref 0.8–5.3)
LYMPHOCYTES NFR BLD AUTO: 18 %
MAGNESIUM SERPL-MCNC: 1.8 MG/DL (ref 1.8–2.6)
MCH RBC QN AUTO: 27.2 PG (ref 26.5–33)
MCHC RBC AUTO-ENTMCNC: 31.9 G/DL (ref 31.5–36.5)
MCV RBC AUTO: 85 FL (ref 78–100)
MONOCYTES # BLD AUTO: 0.6 10E3/UL (ref 0–1.3)
MONOCYTES NFR BLD AUTO: 7 %
MUCOUS THREADS #/AREA URNS LPF: PRESENT /LPF
NEUTROPHILS # BLD AUTO: 6.6 10E3/UL (ref 1.6–8.3)
NEUTROPHILS NFR BLD AUTO: 73 %
NITRATE UR QL: NEGATIVE
NRBC # BLD AUTO: 0 10E3/UL
NRBC BLD AUTO-RTO: 0 /100
PH UR STRIP: 6 [PH] (ref 5–7)
PLATELET # BLD AUTO: 425 10E3/UL (ref 150–450)
POTASSIUM BLD-SCNC: 4.1 MMOL/L (ref 3.5–5)
PROT SERPL-MCNC: 8.1 G/DL (ref 6–8)
RBC # BLD AUTO: 4.53 10E6/UL (ref 3.8–5.2)
RBC URINE: 2 /HPF
SODIUM SERPL-SCNC: 138 MMOL/L (ref 136–145)
SP GR UR STRIP: 1.03 (ref 1–1.03)
SQUAMOUS EPITHELIAL: 30 /HPF
UROBILINOGEN UR STRIP-MCNC: <2 MG/DL
WBC # BLD AUTO: 8.8 10E3/UL (ref 4–11)
WBC URINE: 1 /HPF

## 2022-02-26 PROCEDURE — 96374 THER/PROPH/DIAG INJ IV PUSH: CPT | Mod: 59

## 2022-02-26 PROCEDURE — 80053 COMPREHEN METABOLIC PANEL: CPT | Performed by: EMERGENCY MEDICINE

## 2022-02-26 PROCEDURE — 80143 DRUG ASSAY ACETAMINOPHEN: CPT | Performed by: EMERGENCY MEDICINE

## 2022-02-26 PROCEDURE — 96375 TX/PRO/DX INJ NEW DRUG ADDON: CPT

## 2022-02-26 PROCEDURE — 83735 ASSAY OF MAGNESIUM: CPT | Performed by: EMERGENCY MEDICINE

## 2022-02-26 PROCEDURE — 74177 CT ABD & PELVIS W/CONTRAST: CPT

## 2022-02-26 PROCEDURE — 250N000011 HC RX IP 250 OP 636: Performed by: EMERGENCY MEDICINE

## 2022-02-26 PROCEDURE — 85004 AUTOMATED DIFF WBC COUNT: CPT | Performed by: EMERGENCY MEDICINE

## 2022-02-26 PROCEDURE — 81001 URINALYSIS AUTO W/SCOPE: CPT | Performed by: EMERGENCY MEDICINE

## 2022-02-26 PROCEDURE — 36415 COLL VENOUS BLD VENIPUNCTURE: CPT | Performed by: EMERGENCY MEDICINE

## 2022-02-26 PROCEDURE — 85610 PROTHROMBIN TIME: CPT | Performed by: EMERGENCY MEDICINE

## 2022-02-26 PROCEDURE — 99285 EMERGENCY DEPT VISIT HI MDM: CPT | Mod: 25

## 2022-02-26 RX ORDER — OXYCODONE HYDROCHLORIDE 5 MG/1
5 TABLET ORAL EVERY 6 HOURS PRN
Qty: 8 TABLET | Refills: 0 | Status: SHIPPED | OUTPATIENT
Start: 2022-02-26 | End: 2022-03-01

## 2022-02-26 RX ORDER — KETOROLAC TROMETHAMINE 30 MG/ML
15 INJECTION, SOLUTION INTRAMUSCULAR; INTRAVENOUS ONCE
Status: COMPLETED | OUTPATIENT
Start: 2022-02-26 | End: 2022-02-26

## 2022-02-26 RX ORDER — MORPHINE SULFATE 4 MG/ML
4 INJECTION, SOLUTION INTRAMUSCULAR; INTRAVENOUS ONCE
Status: COMPLETED | OUTPATIENT
Start: 2022-02-26 | End: 2022-02-26

## 2022-02-26 RX ORDER — IOPAMIDOL 755 MG/ML
100 INJECTION, SOLUTION INTRAVASCULAR ONCE
Status: COMPLETED | OUTPATIENT
Start: 2022-02-26 | End: 2022-02-26

## 2022-02-26 RX ADMIN — KETOROLAC TROMETHAMINE 15 MG: 30 INJECTION, SOLUTION INTRAMUSCULAR at 16:37

## 2022-02-26 RX ADMIN — MORPHINE SULFATE 4 MG: 4 INJECTION INTRAVENOUS at 16:37

## 2022-02-26 RX ADMIN — IOPAMIDOL 100 ML: 755 INJECTION, SOLUTION INTRAVENOUS at 17:41

## 2022-02-26 NOTE — ED PROVIDER NOTES
EMERGENCY DEPARTMENT ENCOUNTER      NAME: Marga Benavides  AGE: 56 year old female  YOB: 1965  MRN: [id number]  EVALUATION DATE & TIME: 2/26/2022  3:18 PM    PCP: Valentino Blandon    ED PROVIDER: Traci Danielson M.D.      Chief Complaint   Patient presents with     Flank Pain     FINAL IMPRESSION:  1. Flank pain      ED COURSE & MEDICAL DECISION MAKING:    Pertinent Labs & Imaging studies reviewed. (See chart for details)  ED Course as of 02/26/22 2006   Sat Feb 26, 2022   1604 Patient is a pleasant 56-year-old female that comes in today with left flank pain.  She reports is been going on for the last 3 days.  She reports that it is pretty constant.  She has been taking Tylenol for the pain.  She reports taking about twenty 325 mg tabs yesterday.  That comes to about 6500 and a day which is over the limit but not quite at the toxic limit.  We will plan to check a Tylenol level and an INR on her.  She is having some urinary symptoms so we will check a urinalysis.  She will also need CT imaging of her abdomen and pelvis to further evaluate this flank pain.  I will order her some Toradol and some morphine to help treat her pain.  On exam she is laying on her abdomen and writhing around in the bed.  She does have tenderness to the left costovertebral angle and left flank and left lower abdomen.  I discussed the plan with her and she is in agreement.   1811 Patient CT scan shows a small complex cyst on the left side as well as a possible mass.  I will call colorectal surgery and see if this mass could be causing pain.  Her lab work is unremarkable.  Her urine does not appear infected.   1852 Checked on the patient. She still having a lot of pain intermittently. She did get some relief with the morphine and Toradol but is not getting good relief at home. Colorectal did call me back but I missed their call so we will repage them.   1856 Colorectal surgeon felt that GI would be the next step so I  will give them a call and see what their thoughts are on this and if they think this could be causing her discomfort.   1949 I spoke with [facility].  He thought it was unlikely that this was causing her pain.  He will put in a referral for her to get a colonoscopy as an outpatient and I will discuss this with her.  We can try something for pain and then get her discharged.   2002 I discussed the results with the patient and the plan for discharge.  She normally gets care at [facility] but they have been so busy lately she started to come here.  She was comfortable with seeing her GI doctors for the colonoscopy.  She would like some pain meds to go home with so I will prescribe some Oxycodone for this discomfort.  She is comfortable with discharge home.       3:55 PM I met with the patient for the initial interview and physical examination. Discussed plan for treatment and workup in the ED. PPE: Provider wore gloves, N95 mask, eye protection, and surgical cap.  6:47 PM I rechecked and updated the patient.   6:53 PM Spoke with Colorectal.   7:45 PM Spoke with GABRIELLE Marcum.   7:57 PM I rechecked and updated the patient. I discussed the plan for discharge with the patient, and patient is agreeable. We discussed supportive cares at home and reasons for return to the ER including new or worsening symptoms - all questions and concerns addressed. Patient to be discharged by RN.     At the conclusion of the encounter I discussed  the results of all of the tests and the disposition with patient.   All questions were answered.  The patient acknowledged understanding and was involved in the decision making regarding the overall care plan.      I discussed with patient the utility, limitations and findings of the exam/interventions/studies done during this visit as well as the list of differential diagnosis and symptoms to monitor/return to ER for.  Additional verbal discharge instructions were provided.  "    MEDICATIONS GIVEN IN THE EMERGENCY:  Medications   ketorolac (TORADOL) injection 15 mg (15 mg Intravenous Given 2/26/22 1637)   morphine (PF) injection 4 mg (4 mg Intravenous Given 2/26/22 1637)   iopamidol (ISOVUE-370) solution 100 mL (100 mLs Intravenous Given 2/26/22 1741)       NEW PRESCRIPTIONS STARTED AT TODAY'S ER VISIT  New Prescriptions    OXYCODONE (ROXICODONE) 5 MG TABLET    Take 1 tablet (5 mg) by mouth every 6 hours as needed for pain          =================================================================    HPI    Triage Note: Patient with L sided flank pain that began 3 days ago. States pain radiates into her bladder. Using heating pad at home without relief.       Patient information was obtained from: the patient     Use of : N/A        Marga Benavides is a 56 year old female with a history of s/p hysterectomy who presents flank pain .    The patient reports the the onset of left sided flank pain three days ago (2/23). This pain has been constant and radiates to her lower back and her lower abdomen. She states that she has been taking tylenol, and has taken \"a lot\" of them. She denies a history of kidney stones. Additionally, she endorses constipation for the last three days, after eating too much. The patient denies hematuria, and any other symptoms or complaints at this time.     REVIEW OF SYSTEMS   Except as stated in the HPI all other systems reviewed and are negative.    PAST MEDICAL HISTORY:  History reviewed. No pertinent past medical history.    PAST SURGICAL HISTORY:  Past Surgical History:   Procedure Laterality Date     PARTIAL HYSTERECTOMY       TUBAL LIGATION         CURRENT MEDICATIONS:    No current facility-administered medications for this encounter.    Current Outpatient Medications:      oxyCODONE (ROXICODONE) 5 MG tablet, Take 1 tablet (5 mg) by mouth every 6 hours as needed for pain, Disp: 8 tablet, Rfl: 0     acetaminophen (TYLENOL) 500 MG tablet, " [ACETAMINOPHEN (TYLENOL) 500 MG TABLET] Take 2 tablets (1,000 mg total) by mouth every 8 (eight) hours as needed for pain., Disp: 60 tablet, Rfl: 0     albuterol (PROAIR HFA/PROVENTIL HFA/VENTOLIN HFA) 108 (90 Base) MCG/ACT inhaler, Inhale 2 puffs into the lungs every 6 hours, Disp: , Rfl:      famotidine (PEPCID) 20 MG tablet, [FAMOTIDINE (PEPCID) 20 MG TABLET] Take 1 tablet (20 mg total) by mouth 2 (two) times a day. (Patient not taking: Reported on 2/16/2022), Disp: 30 tablet, Rfl: 0     FLUoxetine (PROZAC) 10 MG capsule, Take 10 mg by mouth daily, Disp: , Rfl:      guaiFENesin (ROBITUSSIN) 20 mg/mL SOLN solution, Take 10 mLs by mouth every 4 hours as needed for cough, Disp: , Rfl:      LUBRICATING PLUS EYE DROPS 0.5 % ophthalmic solution, , Disp: , Rfl:      omeprazole (PRILOSEC) 20 MG DR capsule, Take 1 capsule (20 mg) by mouth daily, Disp: 90 capsule, Rfl: 3     ondansetron (ZOFRAN) 4 MG tablet, [ONDANSETRON (ZOFRAN) 4 MG TABLET] Take 1 tablet (4 mg total) by mouth every 8 (eight) hours as needed. (Patient not taking: Reported on 2/16/2022), Disp: 12 tablet, Rfl: 0     oxyCODONE-acetaminophen (PERCOCET/ENDOCET) 5-325 mg per tablet, [OXYCODONE-ACETAMINOPHEN (PERCOCET/ENDOCET) 5-325 MG PER TABLET] Take 1 tablet by mouth every 6 (six) hours as needed. (Patient not taking: Reported on 2/16/2022), Disp: 13 tablet, Rfl: 0     sucralfate (CARAFATE) 1 gram tablet, [SUCRALFATE (CARAFATE) 1 GRAM TABLET] Take 1 tablet (1 g total) by mouth 4 (four) times a day. (Patient not taking: Reported on 2/16/2022), Disp: 60 tablet, Rfl: 0    ALLERGIES:  No Known Allergies    FAMILY HISTORY:  History reviewed. No pertinent family history.    SOCIAL HISTORY:   Social History     Socioeconomic History     Marital status: Single     Spouse name: Not on file     Number of children: Not on file     Years of education: Not on file     Highest education level: Not on file   Occupational History     Not on file   Tobacco Use     Smoking  status: Current Every Day Smoker     Packs/day: 1.00     Smokeless tobacco: Never Used   Substance and Sexual Activity     Alcohol use: Yes     Drug use: Not on file     Sexual activity: Not on file   Other Topics Concern     Not on file   Social History Narrative     Not on file     Social Determinants of Health     Financial Resource Strain: Not on file   Food Insecurity: Not on file   Transportation Needs: Not on file   Physical Activity: Not on file   Stress: Not on file   Social Connections: Not on file   Intimate Partner Violence: Not on file   Housing Stability: Not on file       PHYSICAL EXAM    VITAL SIGNS: /80   Pulse 99   Temp 99.9  F (37.7  C) (Temporal)   Resp 18   Ht 1.524 m (5')   Wt 63.5 kg (140 lb)   SpO2 97%   BMI 27.34 kg/m     GENERAL: Awake, Alert, answering questions, No acute distress, Well nourished. Appears uncomfortable.   HEENT: Normal cephalic, Atraumatic, bilateral external ears normal, No scleral icterus, mask in place  NECK: No obvious swelling or abnormality, No stridor  PULMONARY:Normal and symmetric breath sounds, No respiratory distress, Lungs clear to auscultation bilaterally. No wheezing  CARDIOVASCULAR: Regular rate and rhythm, Distal pulses present and normal.  ABDOMINAL: Soft, Nondistended, No flank tenderness, No palpable masses. Left sided abdominal pain and flank pain. Left CVA tenderness.   BACK: No bruising or tenderness.  EXTREMITIES: Moves all extremities spontaneously, warm, no edema, No major deformities  NEURO: No facial droop, normal motor function, Normal speech   PSYCH: Normal mood and affect  SKIN: No rashes on visualized skin, dry, warm     LAB:  All pertinent labs reviewed and interpreted.  Results for orders placed or performed during the hospital encounter of 02/26/22   CT Abdomen Pelvis w Contrast    Impression    IMPRESSION:   1.  Focal decompression and wall thickening of the rectosigmoid colon is considered indeterminate. A mass could have  this appearance and correlation with colonoscopy is recommended.  2.  No acute abnormality is seen.  3.  Tiny complex cyst at the left ovary.  4.  Small free pelvic fluid.  5.  Two small nodules at the subcutaneous fat of the low anterior left chest wall are indeterminate.   Comprehensive metabolic panel   Result Value Ref Range    Sodium 138 136 - 145 mmol/L    Potassium 4.1 3.5 - 5.0 mmol/L    Chloride 104 98 - 107 mmol/L    Carbon Dioxide (CO2) 22 22 - 31 mmol/L    Anion Gap 12 5 - 18 mmol/L    Urea Nitrogen 12 8 - 22 mg/dL    Creatinine 0.74 0.60 - 1.10 mg/dL    Calcium 10.0 8.5 - 10.5 mg/dL    Glucose 115 70 - 125 mg/dL    Alkaline Phosphatase 78 45 - 120 U/L    AST 16 0 - 40 U/L    ALT 11 0 - 45 U/L    Protein Total 8.1 (H) 6.0 - 8.0 g/dL    Albumin 4.0 3.5 - 5.0 g/dL    Bilirubin Total 0.4 0.0 - 1.0 mg/dL    GFR Estimate >90 >60 mL/min/1.73m2   Result Value Ref Range    Magnesium 1.8 1.8 - 2.6 mg/dL   UA with Microscopic reflex to Culture    Specimen: Urine, Midstream   Result Value Ref Range    Color Urine Yellow Colorless, Straw, Light Yellow, Yellow    Appearance Urine Turbid (A) Clear    Glucose Urine Negative Negative mg/dL    Bilirubin Urine Negative Negative    Ketones Urine Negative Negative mg/dL    Specific Gravity Urine 1.034 (H) 1.001 - 1.030    Blood Urine Negative Negative    pH Urine 6.0 5.0 - 7.0    Protein Albumin Urine 20  (A) Negative mg/dL    Urobilinogen Urine <2.0 <2.0 mg/dL    Nitrite Urine Negative Negative    Leukocyte Esterase Urine Negative Negative    Bacteria Urine Few (A) None Seen /HPF    Mucus Urine Present (A) None Seen /LPF    RBC Urine 2 <=2 /HPF    WBC Urine 1 <=5 /HPF    Squamous Epithelials Urine 30 (H) <=1 /HPF   CBC with platelets and differential   Result Value Ref Range    WBC Count 8.8 4.0 - 11.0 10e3/uL    RBC Count 4.53 3.80 - 5.20 10e6/uL    Hemoglobin 12.3 11.7 - 15.7 g/dL    Hematocrit 38.5 35.0 - 47.0 %    MCV 85 78 - 100 fL    MCH 27.2 26.5 - 33.0 pg    MCHC  31.9 31.5 - 36.5 g/dL    RDW 14.6 10.0 - 15.0 %    Platelet Count 425 150 - 450 10e3/uL    % Neutrophils 73 %    % Lymphocytes 18 %    % Monocytes 7 %    % Eosinophils 1 %    % Basophils 0 %    % Immature Granulocytes 1 %    NRBCs per 100 WBC 0 <1 /100    Absolute Neutrophils 6.6 1.6 - 8.3 10e3/uL    Absolute Lymphocytes 1.6 0.8 - 5.3 10e3/uL    Absolute Monocytes 0.6 0.0 - 1.3 10e3/uL    Absolute Eosinophils 0.0 0.0 - 0.7 10e3/uL    Absolute Basophils 0.0 0.0 - 0.2 10e3/uL    Absolute Immature Granulocytes 0.0 <=0.4 10e3/uL    Absolute NRBCs 0.0 10e3/uL   Result Value Ref Range    INR 1.05 0.85 - 1.15   Acetaminophen level   Result Value Ref Range    Acetaminophen <3.0 (L) 10.0 - 20.0 ug/mL   Extra Blue Top Tube   Result Value Ref Range    Hold Specimen JIC    Extra Red Top Tube   Result Value Ref Range    Hold Specimen JIC    Extra Green Top (Lithium Heparin) Tube   Result Value Ref Range    Hold Specimen JIC    Extra Purple Top Tube   Result Value Ref Range    Hold Specimen JIC        RADIOLOGY:  CT Abdomen Pelvis w Contrast   Preliminary Result   IMPRESSION:    1.  Focal decompression and wall thickening of the rectosigmoid colon is considered indeterminate. A mass could have this appearance and correlation with colonoscopy is recommended.   2.  No acute abnormality is seen.   3.  Tiny complex cyst at the left ovary.   4.  Small free pelvic fluid.   5.  Two small nodules at the subcutaneous fat of the low anterior left chest wall are indeterminate.          I, Betsy Bond, am serving as a scribe to document services personally performed by Dr. Danielson based on my observation and the provider's statements to me. I, Traci Danielson MD attest that Betsy Bond is acting in a scribe capacity, has observed my performance of the services and has documented them in accordance with my direction.    Traci Danielson M.D.  Emergency Medicine  [facility] EMERGENCY  DEPARTMENT  [address] [place] [id number]  [phone]  Dept: [phone]      Traci Danielson MD  02/26/22 2006

## 2022-02-26 NOTE — ED TRIAGE NOTES
Patient with L sided flank pain that began 3 days ago. States pain radiates into her bladder. Using heating pad at home without relief.

## 2022-02-26 NOTE — ED NOTES
"Found laying prone d/t c/o left abdominal pain that spreads into her back.  No kidney stone hx.  Reported constipation hx, last BM was 2 days ago, not to her satisfaction.  Denies dysuria/hematuria.  Told MD she took \"a lot of tylenol, 325s.\"    "

## 2022-02-27 NOTE — DISCHARGE INSTRUCTIONS
You were seen in the Emergency Department today for evaluation of some left-sided pain.  Your lab work showed no cause of your symptoms. Your imaging studies showed no significant cause of your symptoms.  You were found to have a mass and need a colonoscopy to further evaluate that.  You were prescribed oxycodone for pain.  Do not exceed 4000 mg of Tylenol a day. You should take all medications as prescribed.  Follow up with your primary care physician to ensure resolution of symptoms. Return if you have new or worsening symptoms.

## 2022-02-27 NOTE — ED NOTES
Discharged:       02/26/22 2018   Departure Condition   Departure Condition Stable   Mobility at Departure Ambulatory   Patient Teaching Discharge instructions reviewed and given;Follow-up care reviewed;Patient / Caregiver verbalized understanding;Pain management discussed;Medications discussed   Departure Mode By self;With family member   Vital Signs   /69   Pulse 74   Resp 16   SpO2 97 %   O2 Device None (Room air)   Temp 98.6  F (37  C)   Temp src Oral   Gali Coma Scale   Best Eye Response 4-->(E4) spontaneous   Best Motor Response 6-->(M6) obeys commands   Best Verbal Response 5-->(V5) oriented   Gali Coma Scale Score 15

## 2022-07-14 ENCOUNTER — OFFICE VISIT (OUTPATIENT)
Dept: FAMILY MEDICINE | Facility: CLINIC | Age: 57
End: 2022-07-14
Payer: COMMERCIAL

## 2022-07-14 ENCOUNTER — HOSPITAL ENCOUNTER (OUTPATIENT)
Dept: GENERAL RADIOLOGY | Facility: HOSPITAL | Age: 57
Discharge: HOME | End: 2022-07-14
Attending: NURSE PRACTITIONER | Admitting: NURSE PRACTITIONER
Payer: COMMERCIAL

## 2022-07-14 VITALS
HEART RATE: 86 BPM | OXYGEN SATURATION: 95 % | WEIGHT: 136.5 LBS | DIASTOLIC BLOOD PRESSURE: 76 MMHG | SYSTOLIC BLOOD PRESSURE: 119 MMHG | RESPIRATION RATE: 24 BRPM | TEMPERATURE: 98.7 F | BODY MASS INDEX: 26.66 KG/M2

## 2022-07-14 DIAGNOSIS — R06.02 SHORTNESS OF BREATH: ICD-10-CM

## 2022-07-14 DIAGNOSIS — R68.83 CHILLS: ICD-10-CM

## 2022-07-14 DIAGNOSIS — R05.9 COUGH: Primary | ICD-10-CM

## 2022-07-14 DIAGNOSIS — R05.9 COUGH: ICD-10-CM

## 2022-07-14 DIAGNOSIS — R07.0 THROAT PAIN: ICD-10-CM

## 2022-07-14 LAB
DEPRECATED S PYO AG THROAT QL EIA: NEGATIVE
FLUAV AG SPEC QL IA: NEGATIVE
FLUBV AG SPEC QL IA: NEGATIVE
GROUP A STREP BY PCR: NOT DETECTED
SARS-COV-2 RNA RESP QL NAA+PROBE: NEGATIVE

## 2022-07-14 PROCEDURE — U0005 INFEC AGEN DETEC AMPLI PROBE: HCPCS | Performed by: NURSE PRACTITIONER

## 2022-07-14 PROCEDURE — 87804 INFLUENZA ASSAY W/OPTIC: CPT | Performed by: NURSE PRACTITIONER

## 2022-07-14 PROCEDURE — 99214 OFFICE O/P EST MOD 30 MIN: CPT | Mod: CS | Performed by: NURSE PRACTITIONER

## 2022-07-14 PROCEDURE — U0003 INFECTIOUS AGENT DETECTION BY NUCLEIC ACID (DNA OR RNA); SEVERE ACUTE RESPIRATORY SYNDROME CORONAVIRUS 2 (SARS-COV-2) (CORONAVIRUS DISEASE [COVID-19]), AMPLIFIED PROBE TECHNIQUE, MAKING USE OF HIGH THROUGHPUT TECHNOLOGIES AS DESCRIBED BY CMS-2020-01-R: HCPCS | Performed by: NURSE PRACTITIONER

## 2022-07-14 PROCEDURE — 87651 STREP A DNA AMP PROBE: CPT | Performed by: NURSE PRACTITIONER

## 2022-07-14 PROCEDURE — 71046 X-RAY EXAM CHEST 2 VIEWS: CPT | Mod: FY

## 2022-07-14 RX ORDER — POLYETHYLENE GLYCOL 3350 17 G/17G
POWDER, FOR SOLUTION ORAL
COMMUNITY
Start: 2022-03-06 | End: 2022-07-14

## 2022-07-14 RX ORDER — CALCIUM CARBONATE/VITAMIN D3 500 MG-10
TABLET,CHEWABLE ORAL
COMMUNITY
Start: 2022-06-20

## 2022-07-14 RX ORDER — ASCORBIC ACID 500 MG
TABLET ORAL
COMMUNITY
Start: 2022-04-18

## 2022-07-14 RX ORDER — CETIRIZINE HYDROCHLORIDE 10 MG/1
10 TABLET ORAL DAILY PRN
Qty: 30 TABLET | Refills: 3 | Status: SHIPPED | OUTPATIENT
Start: 2022-07-14 | End: 2022-08-13

## 2022-07-14 RX ORDER — ALBUTEROL SULFATE 90 UG/1
2 AEROSOL, METERED RESPIRATORY (INHALATION) EVERY 12 HOURS
COMMUNITY
Start: 2022-03-09

## 2022-07-14 RX ORDER — DOXYCYCLINE 100 MG/1
100 CAPSULE ORAL 2 TIMES DAILY
Qty: 14 CAPSULE | Refills: 0 | Status: SHIPPED | OUTPATIENT
Start: 2022-07-14 | End: 2022-07-21

## 2022-07-14 RX ORDER — MULTIVITAMIN WITH FOLIC ACID 400 MCG
TABLET ORAL
COMMUNITY
Start: 2022-06-20

## 2022-07-14 RX ORDER — ACETAMINOPHEN 500 MG
TABLET ORAL
COMMUNITY
Start: 2022-03-09

## 2022-07-14 RX ORDER — DOCUSATE SODIUM 100 MG/1
CAPSULE, LIQUID FILLED ORAL
COMMUNITY
Start: 2022-03-09

## 2022-07-14 RX ORDER — MELOXICAM 7.5 MG/1
TABLET ORAL
COMMUNITY
Start: 2022-05-06 | End: 2022-07-14

## 2022-07-14 NOTE — PATIENT INSTRUCTIONS
Try restarting allergy medicine cetirizine and doxycycline for cough with possible infection.    Check with your clinic as scheduled.  Come back with fevers over 100.4 worsening shortness of breath.

## 2022-07-14 NOTE — PROGRESS NOTES
Assessment & Plan     Chills    - Influenza A & B Antigen - Clinic Collect    Throat pain    - Streptococcus A Rapid Screen w/Reflex to PCR - Clinic Collect  - Group A Streptococcus PCR Throat Swab    Cough    - Symptomatic; Yes; 6/22/2022 COVID-19 Virus (Coronavirus) by PCR Nose  - XR Chest 2 Views  - cetirizine (ZYRTEC) 10 MG tablet  Dispense: 30 tablet; Refill: 3  - doxycycline hyclate (VIBRAMYCIN) 100 MG capsule  Dispense: 14 capsule; Refill: 0    Shortness of breath    - XR Chest 2 Views       Focused exam and history done due to COVID-19 pandemic in a walk-in setting.      History, exam, and vital signs consistent with a possible bacterial URI due to longstanding smoking history with sputum color change, increased sputum volume, and persistence for 2-1/2 weeks.  Untreated allergies may be contributing.  Lungs today are coarse.  She has been taking a lot of prednisone and been told to avoid if possible.  Took from an old prescription yesterday and did not improve symptoms.  Does have some coarse wheezing on exam.    Negative chest x-ray today.    Recheck if shortness of breath or new fevers develop.  Rest.     Continue albuterol inhaler.    Has a positive review of systems for multiple things, but has been seen through the [facility] system.  Does have a PCP appointment on the 22nd.  Can address other needs at that time as well.  Does want multiple screenings today.  Explained that she has to do this through her PCP clinic.    OTCs recommended: None [   ].  Dextromethorphan  [  ], guaifenesin [ x ], pseudoephedrine [   ], Afrin for no greater than 3 days [  ], Tylenol or ibuprofen [  ].        22 minutes spent on the date of the encounter doing chart review, review of test results, patient visit and documentation         Return in about 1 week (around 7/21/2022).    Gala Loera [facility] DEEP[facility]OD    Subjective     Marga is a 56 year old female who presents to clinic today for  "the following health issues:  Chief Complaint   Patient presents with     Cough     On and off but persistent at night x 2.5 weeks. Wet cough, tried theraflu, cough syrup; no relief. Congestion, SOB, PCR covid negative 4 weeks ago. Itching throat; painful to swallow, chills, body aches     Dizziness     X 1 week. Pt states having headaches every morning.     Eye Problem     Blurred vision both eyes x 1 week. \"Seeing black spots\"     HPI    Coughing up white or yellow/brown mucus.  Smoker.  Coughing worse at night.  Has been going on for 2-1/2 weeks.    Short of breath with lying down.      Has pollen allergies.  Not taking allergy medication now.  Normally goes to [facility].    Using her inhaler.    Took prednisone from old prednisone last night, with food.        Has been getting dizzy/woozy spells with coughing, lightheaded, seeing black spots all week, saw [facility] for black spots x 2, comes and goes.     Hot flashes - seeing OB.     Has PCP appointment scheduled on 22nd.      Review of Systems  See HPI      Objective    /76 (BP Location: Right arm, Patient Position: Sitting, Cuff Size: Adult Regular)   Pulse 86   Temp 98.7  F (37.1  C) (Oral)   Resp 24   Wt 61.9 kg (136 lb 8 oz)   SpO2 95%   BMI 26.66 kg/m    Physical Exam  Constitutional:       General: She is not in acute distress.     Appearance: She is well-developed.   Eyes:      General:         Right eye: No discharge.         Left eye: No discharge.      Conjunctiva/sclera: Conjunctivae normal.   Pulmonary:      Effort: Pulmonary effort is normal.      Comments: Coarse lung sounds throughout.  Musculoskeletal:         General: Normal range of motion.   Skin:     General: Skin is warm and dry.      Capillary Refill: Capillary refill takes less than 2 seconds.   Neurological:      Mental Status: She is alert and oriented to person, place, and time.   Psychiatric:         Mood and Affect: Mood normal.         Behavior: Behavior normal.         Thought " Content: Thought content normal.         Judgment: Judgment normal.

## 2022-07-15 ENCOUNTER — TELEPHONE (OUTPATIENT)
Dept: URGENT CARE | Facility: URGENT CARE | Age: 57
End: 2022-07-15

## 2022-07-15 NOTE — TELEPHONE ENCOUNTER
----- Message from Jaclyn Jackson MD sent at 7/15/2022 11:18 AM CDT -----  Please call pt with negative covid test result

## 2022-07-15 NOTE — TELEPHONE ENCOUNTER
Called and spoke with patient and message was given. No questions.    Marquita Angel on 7/15/2022 at 11:29 AM

## 2022-10-13 ENCOUNTER — OFFICE VISIT (OUTPATIENT)
Dept: FAMILY MEDICINE | Facility: CLINIC | Age: 57
End: 2022-10-13
Payer: COMMERCIAL

## 2022-10-13 VITALS
WEIGHT: 138.1 LBS | HEART RATE: 70 BPM | TEMPERATURE: 98.4 F | BODY MASS INDEX: 26.97 KG/M2 | SYSTOLIC BLOOD PRESSURE: 118 MMHG | OXYGEN SATURATION: 98 % | RESPIRATION RATE: 20 BRPM | DIASTOLIC BLOOD PRESSURE: 77 MMHG

## 2022-10-13 DIAGNOSIS — J45.991 COUGH VARIANT ASTHMA: Primary | ICD-10-CM

## 2022-10-13 DIAGNOSIS — R68.83 CHILLS: ICD-10-CM

## 2022-10-13 LAB
FLUAV AG SPEC QL IA: NEGATIVE
FLUBV AG SPEC QL IA: NEGATIVE
SARS-COV-2 RNA RESP QL NAA+PROBE: NEGATIVE

## 2022-10-13 PROCEDURE — 99213 OFFICE O/P EST LOW 20 MIN: CPT | Mod: CS | Performed by: NURSE PRACTITIONER

## 2022-10-13 PROCEDURE — U0005 INFEC AGEN DETEC AMPLI PROBE: HCPCS | Performed by: NURSE PRACTITIONER

## 2022-10-13 PROCEDURE — U0003 INFECTIOUS AGENT DETECTION BY NUCLEIC ACID (DNA OR RNA); SEVERE ACUTE RESPIRATORY SYNDROME CORONAVIRUS 2 (SARS-COV-2) (CORONAVIRUS DISEASE [COVID-19]), AMPLIFIED PROBE TECHNIQUE, MAKING USE OF HIGH THROUGHPUT TECHNOLOGIES AS DESCRIBED BY CMS-2020-01-R: HCPCS | Performed by: NURSE PRACTITIONER

## 2022-10-13 PROCEDURE — 87804 INFLUENZA ASSAY W/OPTIC: CPT | Performed by: NURSE PRACTITIONER

## 2022-10-13 RX ORDER — MOMETASONE FUROATE AND FORMOTEROL FUMARATE DIHYDRATE 200; 5 UG/1; UG/1
AEROSOL RESPIRATORY (INHALATION)
COMMUNITY
Start: 2022-08-29

## 2022-10-13 ASSESSMENT — ENCOUNTER SYMPTOMS
FEVER: 0
CHILLS: 0

## 2022-10-13 NOTE — PROGRESS NOTES
"Assessment & Plan     Chills    - Influenza A & B Antigen - Clinic Collect    Cough variant asthma, chronic cough        Patient with chronic cough for over 6 months with a smoking history.  Has reduced smoking lately.  Was put on Dulera inhalers by her pulmonologist with [facility].    Symptoms are chronic with no features consistent with acute illness.  Complaining of chest pain, but is very tender over the left chest wall associated with chronic cough.  Also has some abdominal pain on the left side associated with coughing.  Lung sounds are slightly coarse, but otherwise clear without wheezing.    Recommend calling for follow-up pulmonology appointment.     Explained in detail the purpose of Dulera and cough variant asthma.  Explained her pulmonologist thought that her cough was due to asthma and that the Dulera should reduce the cough.    Sputum is clear white, no need for prednisone nor antibiotic associated with this.      OTCs recommended: None [   ].  Dextromethorphan  [  ], guaifenesin [ x ], pseudoephedrine [   ], Afrin for no greater than 3 days [  ], Tylenol or ibuprofen [  ].                No follow-ups on file.    Gala Loera, [facility] ANNABELLE Gresham is a 56 year old female who presents to clinic today for the following health issues:  Chief Complaint   Patient presents with     Cold Symptoms     X 6 months. Congested, dry cough constant, vomit and diarrhea x 3 days. Chills and body aches. No fever. Did have throat pain but has passed. Pt states medications not helping.     HPI    Longstanding dry cough for months. Got worse x 3-4 months ago.  No changes in the last week or 2.  Is just sick of the chronic cough.     Nasal congestion - stuffy when she wakes up and then better.  Just had eye surgery.  \"My whole body aches.\"  No fevers.     \"Choke all night.\"  White and clear sputum.  Has been taking allergy medications prescribed by me in July.  Not " working.      Has reduced smoking.  Stopped x 1 week.      Back is hurting and feels like there is pain with breathing on the left side.  Started this week.  Has been stressed out.     Has seen pulmonology for cough and was diagnosed with cough variant asthma and restarted on Dulera.  Last Dulera was 1 week ago.  Does not take it consistently.          Review of Systems   Constitutional: Negative for chills and fever.           Objective    /77 (BP Location: Right arm, Patient Position: Sitting, Cuff Size: Adult Regular)   Pulse 70   Temp 98.4  F (36.9  C) (Oral)   Resp 20   Wt 62.6 kg (138 lb 1.6 oz)   SpO2 98%   BMI 26.97 kg/m    Physical Exam  Constitutional:       General: She is not in acute distress.     Appearance: She is well-developed and well-nourished.   Eyes:      General:         Right eye: No discharge.         Left eye: No discharge.      Conjunctiva/sclera: Conjunctivae normal.   Cardiovascular:      Rate and Rhythm: Normal rate and regular rhythm.      Pulses: Normal pulses and intact distal pulses.      Heart sounds: Normal heart sounds.   Pulmonary:      Effort: Pulmonary effort is normal.      Breath sounds: Normal breath sounds.   Musculoskeletal:         General: Tenderness (tender to left chest wall ) present. Normal range of motion.   Skin:     General: Skin is warm and dry.      Capillary Refill: Capillary refill takes less than 2 seconds.   Neurological:      Mental Status: She is alert and oriented to person, place, and time.   Psychiatric:         Mood and Affect: Mood and affect and mood normal.         Behavior: Behavior normal.         Thought Content: Thought content normal.         Judgment: Judgment normal.

## 2022-10-13 NOTE — PATIENT INSTRUCTIONS
Restart Dulera every day - this is for preventing cough - take every day     Call to see your pulmonologist for a recheck for the chronic cough.      Tylenol as needed for discomfort - 1000mg  3 times daily as needed       [facility]    [address] [place] [id number] [phone]